# Patient Record
Sex: FEMALE
[De-identification: names, ages, dates, MRNs, and addresses within clinical notes are randomized per-mention and may not be internally consistent; named-entity substitution may affect disease eponyms.]

---

## 2022-06-09 ENCOUNTER — APPOINTMENT (OUTPATIENT)
Dept: CARDIOLOGY | Facility: CLINIC | Age: 62
End: 2022-06-09
Payer: COMMERCIAL

## 2022-06-09 ENCOUNTER — NON-APPOINTMENT (OUTPATIENT)
Age: 62
End: 2022-06-09

## 2022-06-09 VITALS
RESPIRATION RATE: 16 BRPM | TEMPERATURE: 98.2 F | BODY MASS INDEX: 21.42 KG/M2 | DIASTOLIC BLOOD PRESSURE: 63 MMHG | OXYGEN SATURATION: 99 % | HEART RATE: 58 BPM | WEIGHT: 153 LBS | HEIGHT: 71 IN | SYSTOLIC BLOOD PRESSURE: 110 MMHG

## 2022-06-09 DIAGNOSIS — R94.31 ABNORMAL ELECTROCARDIOGRAM [ECG] [EKG]: ICD-10-CM

## 2022-06-09 DIAGNOSIS — R06.00 DYSPNEA, UNSPECIFIED: ICD-10-CM

## 2022-06-09 PROCEDURE — 93000 ELECTROCARDIOGRAM COMPLETE: CPT

## 2022-06-09 PROCEDURE — 99204 OFFICE O/P NEW MOD 45 MIN: CPT

## 2022-06-09 NOTE — PHYSICAL EXAM
[Well Developed] : well developed [Well Nourished] : well nourished [No Acute Distress] : no acute distress [Normal Venous Pressure] : normal venous pressure [Normal S1, S2] : normal S1, S2 [No Murmur] : no murmur [Clear Lung Fields] : clear lung fields [Good Air Entry] : good air entry [No Respiratory Distress] : no respiratory distress  [Soft] : abdomen soft [Non Tender] : non-tender [Normal Gait] : normal gait [No Edema] : no edema [Moves all extremities] : moves all extremities [No Focal Deficits] : no focal deficits [Alert and Oriented] : alert and oriented [de-identified] : warm, pt, dp pulses intact b/l

## 2022-06-09 NOTE — DISCUSSION/SUMMARY
[FreeTextEntry1] : 61F presents to establish care\par \par 1. BERG\par -EKG with anterolateral TWI\par -pt with ( isk factors for CAD fam hx of cardiac dz, age)\par -will check echo to r/o structural heart dz\par -will check nuclear stress test\par \par 2. Lower extremity numbness and painful veins\par -PT, DP pulses intact b/l\par -will check LE venous duplex, abd duplex for flow abnormalities\par -will check ernesto\par -will refer to vascular medicine Dr Cobb for eval\par \par f/u in one month after initial testing\par will check labs (a1c, tsh, bmp at that time)

## 2022-06-09 NOTE — REVIEW OF SYSTEMS
[Dyspnea on exertion] : dyspnea during exertion [Dizziness] : dizziness [Fever] : no fever [Headache] : no headache [Weight Gain (___ Lbs)] : no recent weight gain [Chills] : no chills [Feeling Fatigued] : not feeling fatigued [Weight Loss (___ Lbs)] : no recent weight loss [Blurry Vision] : no blurred vision [Sore Throat] : no sore throat [SOB] : no shortness of breath [Chest Discomfort] : no chest discomfort [Lower Ext Edema] : no extremity edema [Palpitations] : no palpitations [Orthopnea] : no orthopnea [Syncope] : no syncope [Cough] : no cough [Wheezing] : no wheezing [Nausea] : no nausea [Vomiting] : no vomiting [Confusion] : no confusion was observed [Easy Bleeding] : no tendency for easy bleeding [Easy Bruising] : no tendency for easy bruising

## 2022-06-09 NOTE — HISTORY OF PRESENT ILLNESS
[FreeTextEntry1] : 61F presents to establish care\par Sent in by: sister \par PMD:\par \par pt endorsing LE numbness, b/l feet. radiated up the leg. pt also endorsing LE veins are more prominent than usual. pt states all of these symptoms have been ongoing for about a year but has been getting progressively worse over the past few months. pt may also experience b/l hand numbness as well. \par \par pt states BERG. pt states walking up the stairs has become difficult, and when she stops to rest, the symptoms resolve. pt states this has also been going on for several months. no cp, diaphoresis.\par \par Pt denies palpitations diaphoresis, syncope, LE edema, orthopnea.\par pt does endorse hx of vertigo.\par \par \par Prev cardiac history: none\par \par Exercise: none\par Diet: none\par \par Previous cardiac testing: none\par Recent labs:\par \par \par EKG: SR 63. anterolateral TWI\par \par \par Med hx: none\par OBGYN hx: 2  children.  +preeclampsia.  No hx of miscarriage. Currently post menopause. \par Sx hx: fibroids 1980s. c/s\par Family hx: M: CHF\par Social hx:  lives in [place] with sister. not working. no tob/etoh/drugs\par Meds: gabapentin\par allergies: nkda\par

## 2022-06-15 ENCOUNTER — APPOINTMENT (OUTPATIENT)
Dept: CARDIOLOGY | Facility: CLINIC | Age: 62
End: 2022-06-15
Payer: COMMERCIAL

## 2022-06-15 PROCEDURE — 93922 UPR/L XTREMITY ART 2 LEVELS: CPT

## 2022-06-15 PROCEDURE — 93978 VASCULAR STUDY: CPT

## 2022-06-15 PROCEDURE — 93306 TTE W/DOPPLER COMPLETE: CPT

## 2022-06-15 PROCEDURE — 93970 EXTREMITY STUDY: CPT

## 2023-11-02 ENCOUNTER — NON-APPOINTMENT (OUTPATIENT)
Age: 63
End: 2023-11-02

## 2023-11-02 ENCOUNTER — APPOINTMENT (OUTPATIENT)
Dept: NEUROLOGY | Facility: CLINIC | Age: 63
End: 2023-11-02
Payer: COMMERCIAL

## 2023-11-02 VITALS
SYSTOLIC BLOOD PRESSURE: 145 MMHG | HEIGHT: 71 IN | DIASTOLIC BLOOD PRESSURE: 76 MMHG | BODY MASS INDEX: 22.4 KG/M2 | WEIGHT: 160 LBS | HEART RATE: 63 BPM

## 2023-11-02 DIAGNOSIS — Z78.9 OTHER SPECIFIED HEALTH STATUS: ICD-10-CM

## 2023-11-02 DIAGNOSIS — M79.605 PAIN IN RIGHT LEG: ICD-10-CM

## 2023-11-02 DIAGNOSIS — Z82.49 FAMILY HISTORY OF ISCHEMIC HEART DISEASE AND OTHER DISEASES OF THE CIRCULATORY SYSTEM: ICD-10-CM

## 2023-11-02 DIAGNOSIS — Z83.3 FAMILY HISTORY OF DIABETES MELLITUS: ICD-10-CM

## 2023-11-02 DIAGNOSIS — M79.604 PAIN IN RIGHT LEG: ICD-10-CM

## 2023-11-02 PROCEDURE — 99245 OFF/OP CONSLTJ NEW/EST HI 55: CPT

## 2023-11-06 ENCOUNTER — NON-APPOINTMENT (OUTPATIENT)
Age: 63
End: 2023-11-06

## 2023-11-06 DIAGNOSIS — R89.9 UNSPECIFIED ABNORMAL FINDING IN SPECIMENS FROM OTHER ORGANS, SYSTEMS AND TISSUES: ICD-10-CM

## 2023-11-06 LAB
25(OH)D3 SERPL-MCNC: 29.9 NG/ML
AFP-TM SERPL-MCNC: 3.2 NG/ML
ALBUMIN SERPL ELPH-MCNC: 4.4 G/DL
ALP BLD-CCNC: 59 U/L
ALT SERPL-CCNC: 31 U/L
ANION GAP SERPL CALC-SCNC: 10 MMOL/L
AST SERPL-CCNC: 30 U/L
BILIRUB SERPL-MCNC: 0.2 MG/DL
BUN SERPL-MCNC: 16 MG/DL
CALCIUM SERPL-MCNC: 9.5 MG/DL
CHLORIDE SERPL-SCNC: 102 MMOL/L
CO2 SERPL-SCNC: 30 MMOL/L
CREAT SERPL-MCNC: 0.89 MG/DL
CRYOGLOB SERPL-MCNC: NEGATIVE
DEPRECATED KAPPA LC FREE/LAMBDA SER: 2.21 RATIO
EGFR: 72 ML/MIN/1.73M2
ERYTHROCYTE [SEDIMENTATION RATE] IN BLOOD BY WESTERGREN METHOD: 28 MM/HR
FOLATE SERPL-MCNC: 12.8 NG/ML
GLIADIN IGA SER QL: <5 UNITS
GLIADIN IGG SER QL: <5 UNITS
GLIADIN PEPTIDE IGA SER-ACNC: NEGATIVE
GLIADIN PEPTIDE IGG SER-ACNC: NEGATIVE
GLUCOSE SERPL-MCNC: 89 MG/DL
HBV CORE IGG+IGM SER QL: NONREACTIVE
HBV SURFACE AB SER QL: NONREACTIVE
HBV SURFACE AG SER QL: NONREACTIVE
HCV AB SER QL: NONREACTIVE
HCV S/CO RATIO: 0.11 S/CO
HOMOCYSTEINE LEVEL: 8.4 UMOL/L
KAPPA LC CSF-MCNC: 1.54 MG/DL
KAPPA LC SERPL-MCNC: 3.41 MG/DL
LDH SERPL-CCNC: 213 U/L
LEAD BLD-MCNC: 2.3 UG/DL
MERCURY BLD-MCNC: 2 UG/L
METHYLMALONIC ACID LEVEL: 114 NMOL/L
PCA AB SER QL IF: NORMAL
POTASSIUM SERPL-SCNC: 4.2 MMOL/L
PROT SERPL-MCNC: 7.1 G/DL
SODIUM SERPL-SCNC: 142 MMOL/L
T PALLIDUM AB SER QL IA: NEGATIVE
T4 SERPL-MCNC: 7 UG/DL
TSH SERPL-ACNC: 5.39 UIU/ML
VGCC-P/Q BIND AB SER-SCNC: 0 PMOL/L
VIT B12 SERPL-MCNC: 1997 PG/ML

## 2023-11-07 LAB
ALBUMIN MFR SERPL ELPH: 57.6 %
ALBUMIN SERPL-MCNC: 4.1 G/DL
ALBUMIN/GLOB SERPL: 1.4 RATIO
ALPHA1 GLOB MFR SERPL ELPH: 3.8 %
ALPHA1 GLOB SERPL ELPH-MCNC: 0.3 G/DL
ALPHA2 GLOB MFR SERPL ELPH: 10.9 %
ALPHA2 GLOB SERPL ELPH-MCNC: 0.8 G/DL
B-GLOBULIN MFR SERPL ELPH: 11 %
B-GLOBULIN SERPL ELPH-MCNC: 0.8 G/DL
B2 MICROGLOB 24H UR-MCNC: 88 UG/L
GAMMA GLOB FLD ELPH-MCNC: 1.2 G/DL
GAMMA GLOB MFR SERPL ELPH: 16.7 %
GASTRIN SERPL-MCNC: 50 PG/ML
IF BLOCK AB SER QL: 1 AU/ML
INTERPRETATION SERPL IEP-IMP: NORMAL
M PROTEIN SPEC IFE-MCNC: NORMAL
PROT SERPL-MCNC: 7.1 G/DL
PROT SERPL-MCNC: 7.1 G/DL
VIT B6 SERPL-MCNC: 15.4 UG/L

## 2023-11-09 LAB — GQ1B AB IGG: NORMAL TITER

## 2023-11-10 LAB
A-TOCOPHEROL VIT E SERPL-MCNC: 12.1 MG/L
BETA+GAMMA TOCOPHEROL SERPL-MCNC: 0.8 MG/L
SULFATIDE AB SER QL: NORMAL
SULFATIDE ANTIBODIES COMMENTS: NORMAL
SULFATIDE ANTIBODIES METHODS: NORMAL
SULFATIDE ANTIBODIES REFERENCES: NORMAL
SULFATIDE ANTIBODIES TECHNICAL RESULTS: NORMAL

## 2023-11-14 ENCOUNTER — LABORATORY RESULT (OUTPATIENT)
Age: 63
End: 2023-11-14

## 2023-11-14 ENCOUNTER — NON-APPOINTMENT (OUTPATIENT)
Age: 63
End: 2023-11-14

## 2023-11-14 ENCOUNTER — APPOINTMENT (OUTPATIENT)
Dept: INTERNAL MEDICINE | Facility: CLINIC | Age: 63
End: 2023-11-14
Payer: COMMERCIAL

## 2023-11-14 VITALS
SYSTOLIC BLOOD PRESSURE: 110 MMHG | DIASTOLIC BLOOD PRESSURE: 65 MMHG | OXYGEN SATURATION: 96 % | HEART RATE: 61 BPM | WEIGHT: 170 LBS | HEIGHT: 71 IN | BODY MASS INDEX: 23.8 KG/M2 | TEMPERATURE: 98.3 F

## 2023-11-14 DIAGNOSIS — Z12.11 ENCOUNTER FOR SCREENING FOR MALIGNANT NEOPLASM OF COLON: ICD-10-CM

## 2023-11-14 DIAGNOSIS — Z01.818 ENCOUNTER FOR OTHER PREPROCEDURAL EXAMINATION: ICD-10-CM

## 2023-11-14 DIAGNOSIS — E03.8 OTHER SPECIFIED HYPOTHYROIDISM: ICD-10-CM

## 2023-11-14 DIAGNOSIS — Z00.00 ENCOUNTER FOR GENERAL ADULT MEDICAL EXAMINATION W/OUT ABNORMAL FINDINGS: ICD-10-CM

## 2023-11-14 DIAGNOSIS — R35.0 FREQUENCY OF MICTURITION: ICD-10-CM

## 2023-11-14 DIAGNOSIS — R73.03 PREDIABETES.: ICD-10-CM

## 2023-11-14 LAB
COPPER SERPL-MCNC: 136 UG/DL
HU AB SER QL: NEGATIVE
MAG AB SER QL: NEGATIVE
PURKINJE CELLS AB SER QL IF: NEGATIVE
ZINC SERPL-MCNC: 80 UG/DL

## 2023-11-14 PROCEDURE — 36415 COLL VENOUS BLD VENIPUNCTURE: CPT

## 2023-11-14 PROCEDURE — 99214 OFFICE O/P EST MOD 30 MIN: CPT | Mod: 25

## 2023-11-14 PROCEDURE — 93000 ELECTROCARDIOGRAM COMPLETE: CPT

## 2023-11-15 ENCOUNTER — APPOINTMENT (OUTPATIENT)
Dept: CARDIOLOGY | Facility: CLINIC | Age: 63
End: 2023-11-15
Payer: COMMERCIAL

## 2023-11-15 ENCOUNTER — NON-APPOINTMENT (OUTPATIENT)
Age: 63
End: 2023-11-15

## 2023-11-15 VITALS
HEART RATE: 63 BPM | DIASTOLIC BLOOD PRESSURE: 66 MMHG | SYSTOLIC BLOOD PRESSURE: 126 MMHG | BODY MASS INDEX: 24.22 KG/M2 | HEIGHT: 71 IN | TEMPERATURE: 98.1 F | WEIGHT: 173 LBS | OXYGEN SATURATION: 100 %

## 2023-11-15 PROBLEM — R73.03 PREDIABETES: Status: ACTIVE | Noted: 2023-11-15

## 2023-11-15 LAB
ANION GAP SERPL CALC-SCNC: 11 MMOL/L
APPEARANCE: CLEAR
BILIRUBIN URINE: NEGATIVE
BLOOD URINE: NEGATIVE
BUN SERPL-MCNC: 18 MG/DL
CALCIUM SERPL-MCNC: 9.5 MG/DL
CHLORIDE SERPL-SCNC: 104 MMOL/L
CO2 SERPL-SCNC: 27 MMOL/L
COLOR: YELLOW
CREAT SERPL-MCNC: 0.85 MG/DL
EGFR: 76 ML/MIN/1.73M2
ESTIMATED AVERAGE GLUCOSE: 123 MG/DL
GLUCOSE QUALITATIVE U: NEGATIVE MG/DL
GLUCOSE SERPL-MCNC: 88 MG/DL
HBA1C MFR BLD HPLC: 5.9 %
HCT VFR BLD CALC: 36.5 %
HGB BLD-MCNC: 11.3 G/DL
KETONES URINE: NEGATIVE MG/DL
LEUKOCYTE ESTERASE URINE: ABNORMAL
MCHC RBC-ENTMCNC: 21.6 PG
MCHC RBC-ENTMCNC: 31 GM/DL
MCV RBC AUTO: 69.8 FL
NITRITE URINE: NEGATIVE
PH URINE: 5.5
PLATELET # BLD AUTO: 174 K/UL
POTASSIUM SERPL-SCNC: 4.3 MMOL/L
PROTEIN URINE: NEGATIVE MG/DL
RBC # BLD: 5.23 M/UL
RBC # FLD: 17.7 %
SODIUM SERPL-SCNC: 142 MMOL/L
SPECIFIC GRAVITY URINE: 1.02
TSH SERPL-ACNC: 4.63 UIU/ML
UROBILINOGEN URINE: 0.2 MG/DL
WBC # FLD AUTO: 3.6 K/UL

## 2023-11-15 PROCEDURE — 99214 OFFICE O/P EST MOD 30 MIN: CPT | Mod: 25

## 2023-11-15 PROCEDURE — 93000 ELECTROCARDIOGRAM COMPLETE: CPT

## 2023-11-21 LAB — ARSENIC, BLOOD: <10 NG/ML

## 2023-12-04 ENCOUNTER — OUTPATIENT (OUTPATIENT)
Dept: OUTPATIENT SERVICES | Facility: HOSPITAL | Age: 63
LOS: 1 days | Discharge: HOME | End: 2023-12-04

## 2023-12-04 DIAGNOSIS — E85.81 LIGHT CHAIN (AL) AMYLOIDOSIS: ICD-10-CM

## 2023-12-06 ENCOUNTER — RESULT REVIEW (OUTPATIENT)
Age: 63
End: 2023-12-06

## 2023-12-06 ENCOUNTER — APPOINTMENT (OUTPATIENT)
Dept: HEMATOLOGY ONCOLOGY | Facility: CLINIC | Age: 63
End: 2023-12-06
Payer: COMMERCIAL

## 2023-12-06 ENCOUNTER — NON-APPOINTMENT (OUTPATIENT)
Age: 63
End: 2023-12-06

## 2023-12-06 VITALS
OXYGEN SATURATION: 98 % | HEIGHT: 68.5 IN | DIASTOLIC BLOOD PRESSURE: 64 MMHG | SYSTOLIC BLOOD PRESSURE: 101 MMHG | BODY MASS INDEX: 26.42 KG/M2 | TEMPERATURE: 98.9 F | HEART RATE: 70 BPM | RESPIRATION RATE: 16 BRPM | WEIGHT: 176.37 LBS

## 2023-12-06 DIAGNOSIS — D56.3 THALASSEMIA MINOR: ICD-10-CM

## 2023-12-06 DIAGNOSIS — D64.9 ANEMIA, UNSPECIFIED: ICD-10-CM

## 2023-12-06 LAB
BASOPHILS # BLD AUTO: 0.01 K/UL — SIGNIFICANT CHANGE UP (ref 0–0.2)
BASOPHILS # BLD AUTO: 0.01 K/UL — SIGNIFICANT CHANGE UP (ref 0–0.2)
BASOPHILS NFR BLD AUTO: 0.3 % — SIGNIFICANT CHANGE UP (ref 0–2)
BASOPHILS NFR BLD AUTO: 0.3 % — SIGNIFICANT CHANGE UP (ref 0–2)
EOSINOPHIL # BLD AUTO: 0.05 K/UL — SIGNIFICANT CHANGE UP (ref 0–0.5)
EOSINOPHIL # BLD AUTO: 0.05 K/UL — SIGNIFICANT CHANGE UP (ref 0–0.5)
EOSINOPHIL NFR BLD AUTO: 1.4 % — SIGNIFICANT CHANGE UP (ref 0–6)
EOSINOPHIL NFR BLD AUTO: 1.4 % — SIGNIFICANT CHANGE UP (ref 0–6)
HCT VFR BLD CALC: 38.9 % — SIGNIFICANT CHANGE UP (ref 34.5–45)
HCT VFR BLD CALC: 38.9 % — SIGNIFICANT CHANGE UP (ref 34.5–45)
HGB BLD-MCNC: 12 G/DL — SIGNIFICANT CHANGE UP (ref 11.5–15.5)
HGB BLD-MCNC: 12 G/DL — SIGNIFICANT CHANGE UP (ref 11.5–15.5)
IMM GRANULOCYTES NFR BLD AUTO: 0.3 % — SIGNIFICANT CHANGE UP (ref 0–0.9)
IMM GRANULOCYTES NFR BLD AUTO: 0.3 % — SIGNIFICANT CHANGE UP (ref 0–0.9)
LYMPHOCYTES # BLD AUTO: 0.94 K/UL — LOW (ref 1–3.3)
LYMPHOCYTES # BLD AUTO: 0.94 K/UL — LOW (ref 1–3.3)
LYMPHOCYTES # BLD AUTO: 27.2 % — SIGNIFICANT CHANGE UP (ref 13–44)
LYMPHOCYTES # BLD AUTO: 27.2 % — SIGNIFICANT CHANGE UP (ref 13–44)
MCHC RBC-ENTMCNC: 21.7 PG — LOW (ref 27–34)
MCHC RBC-ENTMCNC: 21.7 PG — LOW (ref 27–34)
MCHC RBC-ENTMCNC: 30.8 G/DL — LOW (ref 32–36)
MCHC RBC-ENTMCNC: 30.8 G/DL — LOW (ref 32–36)
MCV RBC AUTO: 70.2 FL — LOW (ref 80–100)
MCV RBC AUTO: 70.2 FL — LOW (ref 80–100)
MONOCYTES # BLD AUTO: 0.33 K/UL — SIGNIFICANT CHANGE UP (ref 0–0.9)
MONOCYTES # BLD AUTO: 0.33 K/UL — SIGNIFICANT CHANGE UP (ref 0–0.9)
MONOCYTES NFR BLD AUTO: 9.6 % — SIGNIFICANT CHANGE UP (ref 2–14)
MONOCYTES NFR BLD AUTO: 9.6 % — SIGNIFICANT CHANGE UP (ref 2–14)
NEUTROPHILS # BLD AUTO: 2.11 K/UL — SIGNIFICANT CHANGE UP (ref 1.8–7.4)
NEUTROPHILS # BLD AUTO: 2.11 K/UL — SIGNIFICANT CHANGE UP (ref 1.8–7.4)
NEUTROPHILS NFR BLD AUTO: 61.2 % — SIGNIFICANT CHANGE UP (ref 43–77)
NEUTROPHILS NFR BLD AUTO: 61.2 % — SIGNIFICANT CHANGE UP (ref 43–77)
NRBC # BLD: 0 /100 WBCS — SIGNIFICANT CHANGE UP (ref 0–0)
NRBC # BLD: 0 /100 WBCS — SIGNIFICANT CHANGE UP (ref 0–0)
PLATELET # BLD AUTO: 196 K/UL — SIGNIFICANT CHANGE UP (ref 150–400)
PLATELET # BLD AUTO: 196 K/UL — SIGNIFICANT CHANGE UP (ref 150–400)
RBC # BLD: 5.54 M/UL — HIGH (ref 3.8–5.2)
RBC # BLD: 5.54 M/UL — HIGH (ref 3.8–5.2)
RBC # FLD: 16.2 % — HIGH (ref 10.3–14.5)
RBC # FLD: 16.2 % — HIGH (ref 10.3–14.5)
RETICS #: 49.9 K/UL — SIGNIFICANT CHANGE UP (ref 25–125)
RETICS #: 49.9 K/UL — SIGNIFICANT CHANGE UP (ref 25–125)
RETICS/RBC NFR: 0.9 % — SIGNIFICANT CHANGE UP (ref 0.5–2.5)
RETICS/RBC NFR: 0.9 % — SIGNIFICANT CHANGE UP (ref 0.5–2.5)
WBC # BLD: 3.45 K/UL — LOW (ref 3.8–10.5)
WBC # BLD: 3.45 K/UL — LOW (ref 3.8–10.5)
WBC # FLD AUTO: 3.45 K/UL — LOW (ref 3.8–10.5)
WBC # FLD AUTO: 3.45 K/UL — LOW (ref 3.8–10.5)

## 2023-12-06 PROCEDURE — 99205 OFFICE O/P NEW HI 60 MIN: CPT

## 2023-12-07 ENCOUNTER — NON-APPOINTMENT (OUTPATIENT)
Age: 63
End: 2023-12-07

## 2023-12-07 PROBLEM — D64.9 ANEMIA, UNSPECIFIED TYPE: Status: ACTIVE | Noted: 2023-11-15

## 2023-12-07 PROBLEM — D56.3 ALPHA THALASSEMIA MINOR: Status: ACTIVE | Noted: 2023-12-07

## 2023-12-13 ENCOUNTER — APPOINTMENT (OUTPATIENT)
Dept: VASCULAR SURGERY | Facility: CLINIC | Age: 63
End: 2023-12-13
Payer: COMMERCIAL

## 2023-12-13 VITALS
TEMPERATURE: 97.8 F | HEIGHT: 71 IN | HEART RATE: 60 BPM | WEIGHT: 173 LBS | OXYGEN SATURATION: 99 % | SYSTOLIC BLOOD PRESSURE: 118 MMHG | DIASTOLIC BLOOD PRESSURE: 68 MMHG | BODY MASS INDEX: 24.22 KG/M2

## 2023-12-13 PROCEDURE — 99203 OFFICE O/P NEW LOW 30 MIN: CPT

## 2023-12-13 RX ORDER — BIOTIN 10 MG
TABLET ORAL
Refills: 0 | Status: DISCONTINUED | COMMUNITY
End: 2023-12-13

## 2023-12-13 RX ORDER — SODIUM FLUORIDE, VITAMIN A, ASCORBIC ACID, VITAMIN D, ALPHA-TOCOPHEROL, THIAMINE, RIBOFLAVIN, NIACIN, PYRIDOXINE, FOLIC ACID, AND CYANOCOBALAMIN .25; 2500; 60; 400; 15; 1.05; 1.2; 13.5; 1.05; .3; 4.5 MG/1; [IU]/1; MG/1; [IU]/1; [IU]/1; MG/1; MG/1; MG/1; MG/1; MG/1; UG/1
TABLET, CHEWABLE ORAL
Refills: 0 | Status: DISCONTINUED | COMMUNITY
End: 2023-12-13

## 2023-12-13 RX ORDER — ASCORBIC ACID 500 MG
TABLET ORAL
Refills: 0 | Status: DISCONTINUED | COMMUNITY
End: 2023-12-13

## 2023-12-13 RX ORDER — ASPIRIN 81 MG
81 TABLET, DELAYED RELEASE (ENTERIC COATED) ORAL
Refills: 0 | Status: DISCONTINUED | COMMUNITY
End: 2023-12-13

## 2023-12-13 RX ORDER — PNV NO.95/FERROUS FUM/FOLIC AC 28MG-0.8MG
TABLET ORAL
Refills: 0 | Status: DISCONTINUED | COMMUNITY
End: 2023-12-13

## 2023-12-14 LAB
ALBUMIN MFR SERPL ELPH: 57.8 %
ALBUMIN SERPL ELPH-MCNC: 4.2 G/DL
ALBUMIN SERPL-MCNC: 4.3 G/DL
ALBUMIN/GLOB SERPL: 1.3 RATIO
ALP BLD-CCNC: 67 U/L
ALPHA1 GLOB MFR SERPL ELPH: 3.6 %
ALPHA1 GLOB SERPL ELPH-MCNC: 0.3 G/DL
ALPHA2 GLOB MFR SERPL ELPH: 10.3 %
ALPHA2 GLOB SERPL ELPH-MCNC: 0.8 G/DL
ALT SERPL-CCNC: 19 U/L
ANION GAP SERPL CALC-SCNC: 11 MMOL/L
AST SERPL-CCNC: 16 U/L
B-GLOBULIN MFR SERPL ELPH: 10.9 %
B-GLOBULIN SERPL ELPH-MCNC: 0.8 G/DL
BILIRUB SERPL-MCNC: <0.2 MG/DL
BUN SERPL-MCNC: 23 MG/DL
CALCIUM SERPL-MCNC: 9.1 MG/DL
CHLORIDE SERPL-SCNC: 103 MMOL/L
CO2 SERPL-SCNC: 28 MMOL/L
CREAT SERPL-MCNC: 1.01 MG/DL
DEPRECATED KAPPA LC FREE/LAMBDA SER: 1.95 RATIO
EGFR: 62 ML/MIN/1.73M2
EPO SERPL-MCNC: 17.1 MIU/ML
FERRITIN SERPL-MCNC: 135 NG/ML
GAMMA GLOB FLD ELPH-MCNC: 1.3 G/DL
GAMMA GLOB MFR SERPL ELPH: 17.4 %
GLUCOSE SERPL-MCNC: 84 MG/DL
HGB A MFR BLD: 97.9 %
HGB A2 MFR BLD: 2.1 %
HGB FRACT BLD-IMP: NORMAL
IGA SER QL IEP: 181 MG/DL
IGG SER QL IEP: 1324 MG/DL
IGM SER QL IEP: 83 MG/DL
INTERPRETATION SERPL IEP-IMP: NORMAL
IRON SATN MFR SERPL: 24 %
IRON SERPL-MCNC: 70 UG/DL
KAPPA LC CSF-MCNC: 1.58 MG/DL
KAPPA LC SERPL-MCNC: 3.08 MG/DL
M PROTEIN SPEC IFE-MCNC: NORMAL
POTASSIUM SERPL-SCNC: 3.9 MMOL/L
PROT SERPL-MCNC: 7 G/DL
PROT SERPL-MCNC: 7.5 G/DL
PROT SERPL-MCNC: 7.5 G/DL
SODIUM SERPL-SCNC: 141 MMOL/L
STFR SERPL-MCNC: 27.6 NMOL/L
TIBC SERPL-MCNC: 292 UG/DL
UIBC SERPL-MCNC: 222 UG/DL
VASCULAR ENDOTHELIAL GF: 197 PG/ML

## 2023-12-19 NOTE — PHYSICAL EXAM
[2+] : left 2+ [Calm] : calm [de-identified] : Well-appearing  [de-identified] : EOMI, anicteric  [de-identified] : soft, nt, nd  [de-identified] : motor intact in bilateral legs. sensation diminished in feet.  [de-identified] : spider veins on legs.  [de-identified] : A&Ox4

## 2023-12-19 NOTE — ASSESSMENT
[FreeTextEntry1] : DEEDEE VARGAS is a 63 year old female presents for evaluation.   > Venous insufficiency, CEAP C1 - Will obtain venous reflux studies for further evaluation at next visit.  - For symptom management, recommend use of compression stockings (20-30 mmhg, prescription given), leg elevation, and ambulation.  - Doubt venous insufficiency is etiology for her leg numbness.

## 2023-12-19 NOTE — HISTORY OF PRESENT ILLNESS
[FreeTextEntry1] : DEEDEE VARGAS is a 63 year old female who presents for evaluation of spider veins, numbness.   Patient has a history of bilateral peripheral neuropathy. Has been evaluated by neurology recently and is undergoing an EMG in the near future. The numbness of her feet do affect the soles of her feet and the toes. Increasing spider veins in right upper thigh had dand c recntly for uterine   Personal history of DVT - None Family history of DVT - None Family history of venous insufficiency or varicose veins - Dad has varicose veins Current compression stocking usage - Just tried yesterday Has children - two children Sitting and standing for work  + PMH: peripheral neuropathy + PSH:  x 3, myomectomy + FH: diabetes, HTN + SH: nonsmoker, no etoh use + Aller: NKDA  PCP is Dr. Aleyda Faye.

## 2024-01-03 ENCOUNTER — APPOINTMENT (OUTPATIENT)
Dept: VASCULAR SURGERY | Facility: CLINIC | Age: 64
End: 2024-01-03
Payer: SELF-PAY

## 2024-01-03 VITALS
HEART RATE: 62 BPM | HEIGHT: 71 IN | TEMPERATURE: 98.2 F | OXYGEN SATURATION: 96 % | SYSTOLIC BLOOD PRESSURE: 121 MMHG | WEIGHT: 173 LBS | DIASTOLIC BLOOD PRESSURE: 69 MMHG | BODY MASS INDEX: 24.22 KG/M2

## 2024-01-03 PROCEDURE — 99213 OFFICE O/P EST LOW 20 MIN: CPT

## 2024-01-03 PROCEDURE — 93970 EXTREMITY STUDY: CPT

## 2024-01-05 RX ORDER — GABAPENTIN 800 MG/1
800 TABLET, COATED ORAL
Qty: 180 | Refills: 1 | Status: ACTIVE | COMMUNITY
Start: 2023-11-02 | End: 1900-01-01

## 2024-01-05 NOTE — HISTORY OF PRESENT ILLNESS
[FreeTextEntry1] : DEEDEE VARGAS is a 63 year old female who presents for evaluation of spider veins, numbness.   Patient has a history of bilateral peripheral neuropathy. Has been evaluated by neurology recently and is undergoing an EMG in the near future. The numbness of her feet do affect the soles of her feet and the toes. Increasing spider veins in right upper thigh had dand c recntly for uterine   Personal history of DVT - None Family history of DVT - None Family history of venous insufficiency or varicose veins - Dad has varicose veins Current compression stocking usage - Just tried yesterday Has children - two children Sitting and standing for work  + PMH: peripheral neuropathy + PSH:  x 3, myomectomy + FH: diabetes, HTN + SH: nonsmoker, no etoh use + Aller: NKDA  PCP is Dr. Aleyda Faye.  [de-identified] : 1/3/2024 - Pt presents for follow up. Continues to have numbness of her feet. Has EMG scheduled for March 2024. No leg swelling. Using the compression stockings, which she thinks helps her legs feel more comfortable and her veins appear smaller in her feet.

## 2024-01-05 NOTE — DATA REVIEWED
[FreeTextEntry1] : 1/3/2024 - BLE venous duplex Negative for DVT/SVT bilaterally. Right - reflux in right CFV, mid FV, GSV measuring 9.9 mm at junction, 7.1 mm proximally, reflux > 0.5 seconds Left - reflux in CFV, mid FV, GSV measuring 8.4 mm at junction, 6.7 mm proximally, reflux > 0.5 seconds, also in AGSV measuring 8.4 mm at junction, 5.7 mm proximally, reflux > 0.5 seconds.

## 2024-01-05 NOTE — PHYSICAL EXAM
[2+] : left 2+ [Calm] : calm [de-identified] : Well-appearing  [de-identified] : EOMI, anicteric  [de-identified] : soft, nt, nd  [de-identified] : motor intact in bilateral legs. sensation diminished in feet.  [de-identified] : spider veins on legs.  [de-identified] : A&Ox4

## 2024-01-05 NOTE — ASSESSMENT
[FreeTextEntry1] : DEEDEE VARGAS is a 63 year old female presents for evaluation.   > Venous insufficiency, CEAP C1 - Reviewed results of duplex w/ patient. There is evidence of bilateral venous insufficiency.  - For symptom management, recommend use of compression stockings (20-30 mmhg, prescription given), leg elevation, and ambulation.  - Would favor neurologic etiology of patient's signficant bilateral feet numbness. patient is being evaluated by neurology and has emg scheduled.

## 2024-03-13 ENCOUNTER — OUTPATIENT (OUTPATIENT)
Dept: OUTPATIENT SERVICES | Facility: HOSPITAL | Age: 64
LOS: 1 days | Discharge: HOME | End: 2024-03-13

## 2024-03-13 DIAGNOSIS — E85.81 LIGHT CHAIN (AL) AMYLOIDOSIS: ICD-10-CM

## 2024-03-14 ENCOUNTER — APPOINTMENT (OUTPATIENT)
Dept: HEMATOLOGY ONCOLOGY | Facility: CLINIC | Age: 64
End: 2024-03-14

## 2024-03-15 ENCOUNTER — APPOINTMENT (OUTPATIENT)
Dept: NEUROLOGY | Facility: CLINIC | Age: 64
End: 2024-03-15
Payer: MEDICARE

## 2024-03-15 DIAGNOSIS — R20.0 ANESTHESIA OF SKIN: ICD-10-CM

## 2024-03-15 PROCEDURE — 99215 OFFICE O/P EST HI 40 MIN: CPT | Mod: 25

## 2024-03-15 PROCEDURE — 95912 NRV CNDJ TEST 11-12 STUDIES: CPT

## 2024-03-15 PROCEDURE — 95886 MUSC TEST DONE W/N TEST COMP: CPT

## 2024-03-15 NOTE — PROCEDURE
[FreeTextEntry1] :                                                                            Clinical  Neurophysiology  Laboratory [address] [place] [address]  EMG/NCV REPORT    Full Name:	Gustavo Jacques	YOB: 1960 Gender:	Female    Visit Date:	3/15/2024 17:36 Age:	63 Years 3 Months Old Examining Physician:	ward Referring Physician:	ward Height:	5 feet 7 inch    Summary Sensory nerve conductions  Right median sensory nerve action potential had prolonged latency, normal amplitude and decreased conduction velocity.  Right ulnar sensory nerve action potential had normal latency, normal amplitude and decreased conduction velocity.  Right radial sensory nerve action potential had normal latency, normal amplitude and normal conduction velocity.  Right sural sensory nerve action potential had normal latency, decreased amplitude and normal conduction velocity.  Left sural sensory nerve action potential had normal latency, normal amplitude and normal conduction velocity.  Motor nerve conductions Right median nerve compound motor action potential had prolonged latency, decreased amplitude with proximal stimulation and decreased conduction velocity.  Right ulnar nerve compoun motor action potential had prolonged latency, normal amplitude and normal conduction velocity.d   Left peroneal nerve compound motor action potential had prolonged latency, decreased amplitude and decreased conduction velocity.  Right peroneal nerve compound motor action potential had normal latency, decreased amplitude and normal conduction velocity.  Left tibial nerve compound motor action potential had normal latency, decreased amplitude and decreased conduction velocity.  Right tibial nerve compound motor action potential had no response.  Left peroneal nerve compound motor action potential recording at the tibialis anterior had prolonged latency and decreased amplitude.  I was not able to obtain the proximal stimulation.  Right peroneal F-wave latency was within normal limits. Left tibial F-wave latency had no response.  Right median and ulnar nerves were prolonged. Needle EMG Needle EMG was performed using a disposable concentric needle in the following muscles:  Tibialis anterior, medial gastrocnemius, long head of the biceps femoris, vastus lateralis and gluteus medius had no spontaneous activity and normal motor units.  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  There currently is no evidence of acute neuropathic events.  Please see the office note for today's visit.  Clinical and radiologic correlation is advised.  Thank you for the consult, Paulina Hinton MD Diplomat, American Board of Neurology and Psychiatry     Sensory NCS    Nerve / Sites	Rec. Site	Onset Lat	Peak Lat	NP Amp	PP Amp	Segments	Distance	Velocity 		ms	ms	V	V		cm	m/s R Median - Digit II (Antidromic)    Wrist	Dig II	4.06	5.10	22.5	53.6	Wrist - Dig II	13	32 R Ulnar - Digit V (Antidromic)    Wrist	Dig V	3.02	3.91	16.2	36.0	Wrist - Dig V	13	43 R Radial - Anatomical snuff box (Forearm)    Forearm	Wrist	1.56	2.03	25.4	42.7	Forearm - Wrist	9	58 R Sural - Ankle (Calf)    Calf	Ankle	1.67	2.34	5.0	22.9	Calf - Ankle	7	42 L Sural - Ankle (Calf)    Calf	Ankle	2.19	2.81	11.5	56.0	Calf - Ankle	14	64                   Motor NCS    Nerve / Sites	Muscle	Latency	Amplitude	Amp %	Duration	Segments	Distance	Lat Diff	Velocity 		ms	mV	%	ms		cm	ms	m/s R Median - APB    Wrist	APB	4.11	6.5	100	6.82	Wrist - APB	7		    Elbow	APB	9.90	3.6	55.5	8.91	Elbow - Wrist	25	5.78	43 R Ulnar - ADM    Wrist	ADM	3.65	14.2	100	5.83	Wrist - ADM	7		    B.Elbow	ADM	8.96	12.9	90.8	6.15	B.Elbow - Wrist	26	5.31	49    A.Elbow	ADM	10.83	13.1	92.6	6.56	A.Elbow - B.Elbow	10	1.88	53 L Peroneal - EDB    Ankle	EDB	6.15	0.5	100	4.01	Ankle - EDB	10		    Fib head	EDB	14.58	0.3	57.1	4.06	Fib head - Ankle	30	8.44	36    Pop fossa	EDB	16.15	0.3	63.9	3.91	Pop fossa - Fib head	12	1.56	77 R Peroneal - EDB    Ankle	EDB	5.36	0.5	100	4.22	Ankle - EDB	8		    Fib head	EDB	12.97	0.3	60.8	5.21	Fib head - Ankle	33	7.60	43    Pop fossa	EDB	14.84	0.3	59	5.63	Pop fossa - Fib head	13	1.87	69 L Tibial - AH    Ankle	AH	5.83	0.2	100	2.34	Ankle - AH	8		    Pop fossa	AH	15.68	0.3	172	5.26	Pop fossa - Ankle	36	9.84	37 R Tibial - AH    Ankle	AH	NR	NR	NR	NR	Ankle - AH	8		    Pop fossa	AH					Pop fossa - Ankle		NR	 L Peroneal - Tib Ant    Fib Head	Tib Ant	8.75	0.4	100	5.10	Fib Head - Tib Ant			    Pop fossa	Tib Ant					Pop fossa - Fib Head	12		                         F  Wave    Nerve	F Lat	M Lat	F-M Lat 	ms	ms	ms L Peroneal - EDB	47.7	5.8	41.9 L Tibial - AH		6.0	 R Median - APB	34.2	4.5	29.7 R Ulnar - ADM	34.3	3.3	30.9                   EMG       EMG Summary Table	 	Spontaneous	MUAP	Recruitment Muscle	Nerve	Roots	IA	Fib	PSW	Fasc	H.F.	Amp	Dur.	PPP	Pattern L. Tibialis anterior	Deep peroneal (Fibular)	L4-L5	N	None	None	None	None	N	N	N	N L. Gastrocnemius (Medial head)	Tibial	S1-S2	N	None	None	None	None	N	N	N	N L. Biceps femoris (long head)	Sciatic (tibial division)	L5-S2	N	None	None	None	None	N	N	N	N L. Vastus lateralis	Femoral	L2-L4	N	None	None	None	None	N	N	N	N L. Gluteus medius	Superior gluteal	L4-S1	N	None	None	None	None	N	N	N	N

## 2024-03-15 NOTE — DISCUSSION/SUMMARY
[FreeTextEntry1] : 63 W who is seen for neuropathy. Labs reveal subclinical hypothyroid as reflected by the elevated TSH and normal T4 levels. Patient also has vitamin D deficiency in the past and has stopped taking the vitamin D supplements. She was asked to restart. Her vitamin D is mildly decreased at 29.9 whereas the cutoff is 30. She was advised to followup with PMD.   Patient also has elevated kappa to lambda ratios and saw hematology. She was advised to obtain BM study. she will fu after study is completed.   I spent the time noted on the day of this patient encounter preparing for, review of medical records,review of pertinent diagnostic studies, providing and documenting the above E/M service and counseling and educate patient on differential, workup, disease course, and treatment/management. Education was provided to the patient during this encounter. All questions and concerns were answered and addressed in detail. The patient verbalized understanding and agreed to plan. Patient was advised to continue to monitor for neurologic symptoms and to notify my office or go to the nearest emergency room if there are any changes. Any orders/referrals and communications were provided as well. Side effects of the above medications were discussed in detail including but not limited to applicable black box warning and teratogenicity as appropriate. Patient was advised to bring previous records to my office.

## 2024-03-15 NOTE — PHYSICAL EXAM
[Person] : oriented to person [Time] : oriented to time [Place] : oriented to place [Short Term Intact] : short term memory intact [Fluency] : fluency intact [Current Events] : adequate knowledge of current events [Cranial Nerves Optic (II)] : visual acuity intact bilaterally,  visual fields full to confrontation, pupils equal round and reactive to light [Cranial Nerves Facial (VII)] : face symmetrical [Cranial Nerves Oculomotor (III)] : extraocular motion intact [Cranial Nerves Vestibulocochlear (VIII)] : hearing was intact bilaterally [Cranial Nerves Hypoglossal (XII)] : there was no tongue deviation with protrusion [Cranial Nerves Accessory (XI - Cranial And Spinal)] : head turning and shoulder shrug symmetric [Motor Strength] : muscle strength was normal in all four extremities [Motor Tone] : muscle tone was normal in all four extremities [Sensation Tactile Decrease] : light touch was intact [Sensation Pain / Temperature Decrease] : pain and temperature was intact [Abnormal Walk] : normal gait [Coordination - Dysmetria Impaired Finger-to-Nose Bilateral] : not present [1+] : Patella left 1+ [FreeTextEntry7] : negative Tinel and negative Phalen

## 2024-03-20 ENCOUNTER — RESULT REVIEW (OUTPATIENT)
Age: 64
End: 2024-03-20

## 2024-03-20 ENCOUNTER — LABORATORY RESULT (OUTPATIENT)
Age: 64
End: 2024-03-20

## 2024-03-20 ENCOUNTER — APPOINTMENT (OUTPATIENT)
Dept: HEMATOLOGY ONCOLOGY | Facility: CLINIC | Age: 64
End: 2024-03-20

## 2024-03-20 ENCOUNTER — APPOINTMENT (OUTPATIENT)
Dept: HEMATOLOGY ONCOLOGY | Facility: CLINIC | Age: 64
End: 2024-03-20
Payer: MEDICARE

## 2024-03-20 ENCOUNTER — APPOINTMENT (OUTPATIENT)
Dept: INTERNAL MEDICINE | Facility: CLINIC | Age: 64
End: 2024-03-20

## 2024-03-20 ENCOUNTER — APPOINTMENT (OUTPATIENT)
Dept: VASCULAR SURGERY | Facility: CLINIC | Age: 64
End: 2024-03-20
Payer: MEDICARE

## 2024-03-20 VITALS
HEIGHT: 71 IN | HEART RATE: 68 BPM | WEIGHT: 174 LBS | BODY MASS INDEX: 24.36 KG/M2 | DIASTOLIC BLOOD PRESSURE: 55 MMHG | SYSTOLIC BLOOD PRESSURE: 94 MMHG | TEMPERATURE: 98.9 F | RESPIRATION RATE: 17 BRPM | OXYGEN SATURATION: 98 %

## 2024-03-20 VITALS
HEART RATE: 76 BPM | BODY MASS INDEX: 24.29 KG/M2 | WEIGHT: 174.14 LBS | RESPIRATION RATE: 16 BRPM | SYSTOLIC BLOOD PRESSURE: 118 MMHG | OXYGEN SATURATION: 99 % | TEMPERATURE: 98.2 F | DIASTOLIC BLOOD PRESSURE: 72 MMHG

## 2024-03-20 DIAGNOSIS — I87.2 VENOUS INSUFFICIENCY (CHRONIC) (PERIPHERAL): ICD-10-CM

## 2024-03-20 LAB
BASOPHILS # BLD AUTO: 0.02 K/UL — SIGNIFICANT CHANGE UP (ref 0–0.2)
BASOPHILS NFR BLD AUTO: 0.6 % — SIGNIFICANT CHANGE UP (ref 0–2)
EOSINOPHIL # BLD AUTO: 0.06 K/UL — SIGNIFICANT CHANGE UP (ref 0–0.5)
EOSINOPHIL NFR BLD AUTO: 1.8 % — SIGNIFICANT CHANGE UP (ref 0–6)
HCT VFR BLD CALC: 39.6 % — SIGNIFICANT CHANGE UP (ref 34.5–45)
HGB BLD-MCNC: 12.3 G/DL — SIGNIFICANT CHANGE UP (ref 11.5–15.5)
IMM GRANULOCYTES NFR BLD AUTO: 0.3 % — SIGNIFICANT CHANGE UP (ref 0–0.9)
LYMPHOCYTES # BLD AUTO: 1.13 K/UL — SIGNIFICANT CHANGE UP (ref 1–3.3)
LYMPHOCYTES # BLD AUTO: 33 % — SIGNIFICANT CHANGE UP (ref 13–44)
MCHC RBC-ENTMCNC: 21.3 PG — LOW (ref 27–34)
MCHC RBC-ENTMCNC: 31.1 G/DL — LOW (ref 32–36)
MCV RBC AUTO: 68.6 FL — LOW (ref 80–100)
MONOCYTES # BLD AUTO: 0.37 K/UL — SIGNIFICANT CHANGE UP (ref 0–0.9)
MONOCYTES NFR BLD AUTO: 10.8 % — SIGNIFICANT CHANGE UP (ref 2–14)
NEUTROPHILS # BLD AUTO: 1.83 K/UL — SIGNIFICANT CHANGE UP (ref 1.8–7.4)
NEUTROPHILS NFR BLD AUTO: 53.5 % — SIGNIFICANT CHANGE UP (ref 43–77)
NRBC # BLD: 0 /100 WBCS — SIGNIFICANT CHANGE UP (ref 0–0)
PLATELET # BLD AUTO: 184 K/UL — SIGNIFICANT CHANGE UP (ref 150–400)
RBC # BLD: 5.77 M/UL — HIGH (ref 3.8–5.2)
RBC # FLD: 15.8 % — HIGH (ref 10.3–14.5)
WBC # BLD: 3.42 K/UL — LOW (ref 3.8–10.5)
WBC # FLD AUTO: 3.42 K/UL — LOW (ref 3.8–10.5)

## 2024-03-20 PROCEDURE — 99213 OFFICE O/P EST LOW 20 MIN: CPT

## 2024-03-20 PROCEDURE — 38221 DX BONE MARROW BIOPSIES: CPT | Mod: LT

## 2024-03-20 NOTE — REASON FOR VISIT
[Bone Marrow Aspiration] : bone marrow aspiration [Bone Marrow Biopsy] : bone marrow biopsy [FreeTextEntry2] : Elevated Kappa Free Light Chain rule out multiple myeloma

## 2024-03-20 NOTE — PROCEDURE
[Bone Marrow Biopsy] : bone marrow biopsy [Patient] : the patient [Bone Marrow Aspiration] : bone marrow aspiration  [Patient identification verified] : patient identification verified [Verbal Consent Obtained] : verbal consent was obtained prior to the procedure [Procedure verified and consent obtained] : procedure verified and consent obtained [Laterality verified and correct site marked] : laterality verified and correct site marked [Correct positioning] : correct positioning [Correct implant and/ or special equipment obtained] : correct impact and/ or special equipment obtained [Prone] : prone [Superior iliac spine was identified] : the superior iliac spine was identified. [Lidocaine was injected and into the periosteum overlying the site.] : Lidocaine was injected and into the periosteum overlying the site. [Aspirate] : aspirate [FISH] : FISH [Cytogenetics] : cytogenetics [Biopsy] : biopsy [Flow Cytometry] : flow cytometry [] : The patient was instructed to remove the bandage the following AM. The patient may bathe. Acetaminophen may be taken for discomfort, as per package directions.If there are any other problems, the patient was instructed to call the office. The patient verbalized understanding, and is aware of the office contact numbers. [The left posterior iliac crest was prepped with betadine and draped, using sterile technique.] : The left posterior iliac crest was prepped with betadine and draped, using sterile technique. [FreeTextEntry1] : Elevated Kappa Free Light Chain rule out multiple myeloma [FreeTextEntry2] :   9 cc of  1% lidocaine was used for the procedure.   WBC:  3.42 K/uL Hgb:  12.3  g/dL Hct:   39.6  % Plts:   184  K/uL   Bone marrow aspiration and biopsy were done. Multiple Myeloma panel requested. [place] Red Staining requested.

## 2024-03-20 NOTE — PHYSICAL EXAM
[2+] : right 2+ [Calm] : calm [de-identified] : Well-appearing  [de-identified] : soft, nt, nd  [de-identified] : EOMI, anicteric  [de-identified] : motor intact in bilateral legs. sensation diminished in feet.  [de-identified] : spider veins on legs.  [de-identified] : A&Ox4

## 2024-03-20 NOTE — ASSESSMENT
[FreeTextEntry1] : DEEDEE VARGAS is a 63 year old female presents for evaluation.   > Venous insufficiency, CEAP C1 - Reviewed results of duplex w/ patient. There is evidence of bilateral venous insufficiency.  - Etiology of patient's leg numbness likely neurologic. Venous insufficiency may contribute to her symptoms of leg discomfort as well.  - We discussed management options and plan to continue conservative management at this time.  - For symptom management, recommend use of compression stockings (20-30 mmhg, prescription given), leg elevation, and ambulation.  - Follow up 3 months with repeat studies.

## 2024-03-20 NOTE — HISTORY OF PRESENT ILLNESS
[FreeTextEntry1] : DEEDEE VARGAS is a 63 year old female who presents for evaluation of spider veins, numbness.   Patient has a history of bilateral peripheral neuropathy. Has been evaluated by neurology recently and is undergoing an EMG in the near future. The numbness of her feet do affect the soles of her feet and the toes. Increasing spider veins in right upper thigh had dand c recntly for uterine   Personal history of DVT - None Family history of DVT - None Family history of venous insufficiency or varicose veins - Dad has varicose veins Current compression stocking usage - Just tried yesterday Has children - two children Sitting and standing for work  + PMH: peripheral neuropathy + PSH:  x 3, myomectomy + FH: diabetes, HTN + SH: nonsmoker, no etoh use + Aller: NKDA  PCP is Dr. Aleyda Faye.  [de-identified] : 1/3/2024 - Pt presents for follow up. Continues to have numbness of her feet. Has EMG scheduled for March 2024. No leg swelling. Using the compression stockings, which she thinks helps her legs feel more comfortable and her veins appear smaller in her feet.   3/20/2024 - Pt presents for follow up. Patient underwent EMG and has neuropathy. She has been intermittently using compression stockings with some relief in her symptoms. Review of neurology note - she underwent BM biopsy today to rule out multiple myeloma.

## 2024-04-02 ENCOUNTER — APPOINTMENT (OUTPATIENT)
Dept: INTERNAL MEDICINE | Facility: CLINIC | Age: 64
End: 2024-04-02

## 2024-05-06 DIAGNOSIS — R76.8 OTHER SPECIFIED ABNORMAL IMMUNOLOGICAL FINDINGS IN SERUM: ICD-10-CM

## 2024-05-09 ENCOUNTER — OUTPATIENT (OUTPATIENT)
Dept: OUTPATIENT SERVICES | Facility: HOSPITAL | Age: 64
LOS: 1 days | Discharge: HOME | End: 2024-05-09

## 2024-05-09 DIAGNOSIS — E85.81 LIGHT CHAIN (AL) AMYLOIDOSIS: ICD-10-CM

## 2024-05-13 ENCOUNTER — APPOINTMENT (OUTPATIENT)
Dept: NEUROLOGY | Facility: CLINIC | Age: 64
End: 2024-05-13

## 2024-05-16 ENCOUNTER — APPOINTMENT (OUTPATIENT)
Dept: HEMATOLOGY ONCOLOGY | Facility: CLINIC | Age: 64
End: 2024-05-16

## 2024-07-24 ENCOUNTER — APPOINTMENT (OUTPATIENT)
Dept: VASCULAR SURGERY | Facility: CLINIC | Age: 64
End: 2024-07-24

## 2024-09-11 ENCOUNTER — APPOINTMENT (OUTPATIENT)
Dept: INTERNAL MEDICINE | Facility: CLINIC | Age: 64
End: 2024-09-11